# Patient Record
(demographics unavailable — no encounter records)

---

## 2025-01-17 NOTE — END OF VISIT
[] : Resident [FreeTextEntry3] : Smoking cessation  COPD - PFTs noted  CT pending for June 2025 - no suspicious findings - stable nodule  Continue wixela and spiriva; albuterol PRN  Post nasal drip - start Flonase

## 2025-01-17 NOTE — ASSESSMENT
[FreeTextEntry1] : 73 year old female hx of COPD is presenting today for follow up.  #COPD - patient denies any complaints on current inhalers, c/w wixela 250 1 puff BID and spiriva 2.5 1 puff daily - c/w Albuterol rescue inhaler PRN, patient reports using it once per week to once every two weeks - failed quitting, will be trying nicotine patch 14 mg daily again, says they were helpful last time.   #Pulmonary nodules - PFTs feb 2024 mod obstruction mild emphysema with air trapping - CT Chest Feb 2024- stable bilateral pulmonary nodules measuring up to 5 mm. - CT Chest Jun 2024- since 2/23/2024, no suspicious pulmonary nodules. Stable 5 mm solid nodule left lower lobe (302/202); Lung Rads Category : 2. Benign appearance/behavior. - repeat low dose CT chest in 6months (July 2025) - patient has stopped going to pulmonary rehab, no need to continue at this time  f/u in 6 months.

## 2025-01-17 NOTE — HISTORY OF PRESENT ILLNESS
[Current] : current [TextBox_4] : 73-year-old female hx of COPD is presenting today for follow up. Patient says she is doing very well on wixela, albuterol, and spiriva. CT lung ca screening showed a 5 mm nodule that has been stable. Patient compliant with her meds. She tried quitting but failed. Smokes once in a while not every day now.  Pt c/o PND, will send Flonase.

## 2025-01-17 NOTE — PHYSICAL EXAM
[No Acute Distress] : no acute distress [Normal Rate/Rhythm] : normal rate/rhythm [Normal S1, S2] : normal s1, s2 [No Resp Distress] : no resp distress [Clear to Auscultation Bilaterally] : clear to auscultation bilaterally [No Edema] : no edema

## 2025-06-27 NOTE — HISTORY OF PRESENT ILLNESS
[Former] : Former [>=20 Pack-Years] : Twenty pack years or greater smoking history: Yes [TextBox_13] : Referred by Dr. Marino Monroe  Ms. TY JACK  is a 74 year old woman with a history of COPD.  She  was seen in the office by Dr. De Jesus  for review of eligibility for, as well as, discussion of Low-Dose CT lung cancer screening program. Over the telephone today we reviewed and confirmed that the patient meets screening eligibility criteria: -Age: 74 year  Smoking status: -Former smoker -Number of pack(s) per day: 1/2  -Number of years smoked: 40 -Number of pack years smokin  -Number of years since quitting smokin -Quit year:   Ms. JACK denies any signs or symptoms of lung cancer including new cough, change in cough, hemoptysis and unintentional weight loss.   Ms. JACK denies any personal history of lung cancer. No lung cancer in a 1st degree relative. Denies any history of lung disease. Denies any history of occupational exposures.  [YearQuit] : 2023 [PacksperDay] : 0.5 [N_Years] : 40 [PacksperYear] : 020

## 2025-06-27 NOTE — PLAN
[FreeTextEntry1] : Plan: -Low Dose CT chest for lung cancer screening -Follow up with patient and her referring provider after her LDCT results have been reviewed by the multi-disciplinary clinical team -Encouraged continued smoking abstinence  Engaged in shared decision making with Ms. TY JACK . Answered all questions. She verbalized understanding and agreement. She knows to call back with any questions or concerns